# Patient Record
Sex: FEMALE | Race: OTHER | Employment: FULL TIME | URBAN - METROPOLITAN AREA
[De-identification: names, ages, dates, MRNs, and addresses within clinical notes are randomized per-mention and may not be internally consistent; named-entity substitution may affect disease eponyms.]

---

## 2021-09-09 ENCOUNTER — ANESTHESIA (OUTPATIENT)
Dept: SURGERY | Facility: HOSPITAL | Age: 21
DRG: 339 | End: 2021-09-09
Payer: COMMERCIAL

## 2021-09-09 ENCOUNTER — HOSPITAL ENCOUNTER (INPATIENT)
Facility: HOSPITAL | Age: 21
LOS: 5 days | Discharge: HOME | DRG: 339 | End: 2021-09-15
Attending: EMERGENCY MEDICINE | Admitting: SURGERY
Payer: COMMERCIAL

## 2021-09-09 ENCOUNTER — APPOINTMENT (OUTPATIENT)
Dept: CT IMAGING | Facility: HOSPITAL | Age: 21
DRG: 339 | End: 2021-09-09
Attending: EMERGENCY MEDICINE
Payer: COMMERCIAL

## 2021-09-09 ENCOUNTER — ANESTHESIA EVENT (OUTPATIENT)
Dept: SURGERY | Facility: HOSPITAL | Age: 21
DRG: 339 | End: 2021-09-09
Payer: COMMERCIAL

## 2021-09-09 DIAGNOSIS — K35.32 ACUTE APPENDICITIS WITH PERFORATION AND LOCALIZED PERITONITIS, UNSPECIFIED WHETHER ABSCESS PRESENT, UNSPECIFIED WHETHER GANGRENE PRESENT: Primary | ICD-10-CM

## 2021-09-09 DIAGNOSIS — K37 APPENDICITIS: ICD-10-CM

## 2021-09-09 LAB
ALBUMIN SERPL-MCNC: 4.2 G/DL (ref 3.4–5)
ALBUMIN/GLOB SERPL: 1.1 {RATIO} (ref 1–2)
ALP LIVER SERPL-CCNC: 58 U/L
ALT SERPL-CCNC: 17 U/L
ANION GAP SERPL CALC-SCNC: 8 MMOL/L (ref 0–18)
AST SERPL-CCNC: 13 U/L (ref 15–37)
B-HCG UR QL: NEGATIVE
BASOPHILS # BLD AUTO: 0.03 X10(3) UL (ref 0–0.2)
BASOPHILS NFR BLD AUTO: 0.2 %
BILIRUB SERPL-MCNC: 0.5 MG/DL (ref 0.1–2)
BILIRUB UR QL STRIP.AUTO: NEGATIVE
BUN BLD-MCNC: 10 MG/DL (ref 7–18)
CALCIUM BLD-MCNC: 8.8 MG/DL (ref 8.5–10.1)
CHLORIDE SERPL-SCNC: 109 MMOL/L (ref 98–112)
CLARITY UR REFRACT.AUTO: CLEAR
CO2 SERPL-SCNC: 22 MMOL/L (ref 21–32)
COLOR UR AUTO: YELLOW
CREAT BLD-MCNC: 0.74 MG/DL
EOSINOPHIL # BLD AUTO: 0 X10(3) UL (ref 0–0.7)
EOSINOPHIL NFR BLD AUTO: 0 %
ERYTHROCYTE [DISTWIDTH] IN BLOOD BY AUTOMATED COUNT: 13.2 %
GLOBULIN PLAS-MCNC: 3.8 G/DL (ref 2.8–4.4)
GLUCOSE BLD-MCNC: 133 MG/DL (ref 70–99)
GLUCOSE UR STRIP.AUTO-MCNC: 50 MG/DL
HCG SERPL QL: NEGATIVE
HCT VFR BLD AUTO: 37.1 %
HGB BLD-MCNC: 12.5 G/DL
IMM GRANULOCYTES # BLD AUTO: 0.07 X10(3) UL (ref 0–1)
IMM GRANULOCYTES NFR BLD: 0.5 %
KETONES UR STRIP.AUTO-MCNC: 20 MG/DL
LEUKOCYTE ESTERASE UR QL STRIP.AUTO: NEGATIVE
LIPASE SERPL-CCNC: 164 U/L (ref 73–393)
LYMPHOCYTES # BLD AUTO: 0.81 X10(3) UL (ref 1–4)
LYMPHOCYTES NFR BLD AUTO: 6.3 %
M PROTEIN MFR SERPL ELPH: 8 G/DL (ref 6.4–8.2)
MCH RBC QN AUTO: 28.3 PG (ref 26–34)
MCHC RBC AUTO-ENTMCNC: 33.7 G/DL (ref 31–37)
MCV RBC AUTO: 84.1 FL
MONOCYTES # BLD AUTO: 0.33 X10(3) UL (ref 0.1–1)
MONOCYTES NFR BLD AUTO: 2.6 %
NEUTROPHILS # BLD AUTO: 11.57 X10 (3) UL (ref 1.5–7.7)
NEUTROPHILS # BLD AUTO: 11.57 X10(3) UL (ref 1.5–7.7)
NEUTROPHILS NFR BLD AUTO: 90.4 %
NITRITE UR QL STRIP.AUTO: NEGATIVE
OSMOLALITY SERPL CALC.SUM OF ELEC: 289 MOSM/KG (ref 275–295)
PH UR STRIP.AUTO: 6 [PH] (ref 5–8)
PLATELET # BLD AUTO: 315 10(3)UL (ref 150–450)
POTASSIUM SERPL-SCNC: 3.3 MMOL/L (ref 3.5–5.1)
PROT UR STRIP.AUTO-MCNC: NEGATIVE MG/DL
RBC # BLD AUTO: 4.41 X10(6)UL
RBC #/AREA URNS AUTO: >10 /HPF
SARS-COV-2 RNA RESP QL NAA+PROBE: NOT DETECTED
SODIUM SERPL-SCNC: 139 MMOL/L (ref 136–145)
SP GR UR STRIP.AUTO: >1.03 (ref 1–1.03)
UROBILINOGEN UR STRIP.AUTO-MCNC: <2 MG/DL
WBC # BLD AUTO: 12.8 X10(3) UL (ref 4–11)

## 2021-09-09 PROCEDURE — 0DTJ4ZZ RESECTION OF APPENDIX, PERCUTANEOUS ENDOSCOPIC APPROACH: ICD-10-PCS | Performed by: SURGERY

## 2021-09-09 PROCEDURE — 74177 CT ABD & PELVIS W/CONTRAST: CPT | Performed by: EMERGENCY MEDICINE

## 2021-09-09 PROCEDURE — 44970 LAPAROSCOPY APPENDECTOMY: CPT | Performed by: SURGERY

## 2021-09-09 PROCEDURE — 99222 1ST HOSP IP/OBS MODERATE 55: CPT | Performed by: SURGERY

## 2021-09-09 RX ORDER — POTASSIUM CHLORIDE 14.9 MG/ML
20 INJECTION INTRAVENOUS ONCE
Status: COMPLETED | OUTPATIENT
Start: 2021-09-09 | End: 2021-09-09

## 2021-09-09 RX ORDER — POTASSIUM CHLORIDE 20 MEQ/1
20 TABLET, EXTENDED RELEASE ORAL ONCE
Status: DISCONTINUED | OUTPATIENT
Start: 2021-09-09 | End: 2021-09-09

## 2021-09-09 RX ORDER — ACETAMINOPHEN 500 MG
1000 TABLET ORAL ONCE AS NEEDED
Status: ACTIVE | OUTPATIENT
Start: 2021-09-09 | End: 2021-09-09

## 2021-09-09 RX ORDER — LABETALOL HYDROCHLORIDE 5 MG/ML
5 INJECTION, SOLUTION INTRAVENOUS EVERY 5 MIN PRN
Status: DISCONTINUED | OUTPATIENT
Start: 2021-09-09 | End: 2021-09-10 | Stop reason: HOSPADM

## 2021-09-09 RX ORDER — DEXAMETHASONE SODIUM PHOSPHATE 4 MG/ML
VIAL (ML) INJECTION AS NEEDED
Status: DISCONTINUED | OUTPATIENT
Start: 2021-09-09 | End: 2021-09-10 | Stop reason: SURG

## 2021-09-09 RX ORDER — MEPERIDINE HYDROCHLORIDE 25 MG/ML
12.5 INJECTION INTRAMUSCULAR; INTRAVENOUS; SUBCUTANEOUS AS NEEDED
Status: DISCONTINUED | OUTPATIENT
Start: 2021-09-09 | End: 2021-09-10 | Stop reason: HOSPADM

## 2021-09-09 RX ORDER — SODIUM CHLORIDE, SODIUM LACTATE, POTASSIUM CHLORIDE, CALCIUM CHLORIDE 600; 310; 30; 20 MG/100ML; MG/100ML; MG/100ML; MG/100ML
INJECTION, SOLUTION INTRAVENOUS CONTINUOUS
Status: DISCONTINUED | OUTPATIENT
Start: 2021-09-09 | End: 2021-09-10 | Stop reason: HOSPADM

## 2021-09-09 RX ORDER — SODIUM CHLORIDE 9 MG/ML
INJECTION, SOLUTION INTRAVENOUS CONTINUOUS
Status: DISCONTINUED | OUTPATIENT
Start: 2021-09-09 | End: 2021-09-10

## 2021-09-09 RX ORDER — ONDANSETRON 2 MG/ML
4 INJECTION INTRAMUSCULAR; INTRAVENOUS AS NEEDED
Status: DISCONTINUED | OUTPATIENT
Start: 2021-09-09 | End: 2021-09-10 | Stop reason: HOSPADM

## 2021-09-09 RX ORDER — NALOXONE HYDROCHLORIDE 0.4 MG/ML
80 INJECTION, SOLUTION INTRAMUSCULAR; INTRAVENOUS; SUBCUTANEOUS AS NEEDED
Status: DISCONTINUED | OUTPATIENT
Start: 2021-09-09 | End: 2021-09-10 | Stop reason: HOSPADM

## 2021-09-09 RX ORDER — HYDROCODONE BITARTRATE AND ACETAMINOPHEN 5; 325 MG/1; MG/1
1 TABLET ORAL AS NEEDED
Status: DISCONTINUED | OUTPATIENT
Start: 2021-09-09 | End: 2021-09-10 | Stop reason: HOSPADM

## 2021-09-09 RX ORDER — HYDROMORPHONE HYDROCHLORIDE 1 MG/ML
0.4 INJECTION, SOLUTION INTRAMUSCULAR; INTRAVENOUS; SUBCUTANEOUS EVERY 5 MIN PRN
Status: DISCONTINUED | OUTPATIENT
Start: 2021-09-09 | End: 2021-09-10 | Stop reason: HOSPADM

## 2021-09-09 RX ORDER — MIDAZOLAM HYDROCHLORIDE 1 MG/ML
1 INJECTION INTRAMUSCULAR; INTRAVENOUS EVERY 5 MIN PRN
Status: DISCONTINUED | OUTPATIENT
Start: 2021-09-09 | End: 2021-09-10 | Stop reason: HOSPADM

## 2021-09-09 RX ORDER — HYDROCODONE BITARTRATE AND ACETAMINOPHEN 5; 325 MG/1; MG/1
2 TABLET ORAL AS NEEDED
Status: DISCONTINUED | OUTPATIENT
Start: 2021-09-09 | End: 2021-09-10 | Stop reason: HOSPADM

## 2021-09-09 RX ORDER — METOCLOPRAMIDE HYDROCHLORIDE 5 MG/ML
10 INJECTION INTRAMUSCULAR; INTRAVENOUS AS NEEDED
Status: DISCONTINUED | OUTPATIENT
Start: 2021-09-09 | End: 2021-09-10 | Stop reason: HOSPADM

## 2021-09-09 RX ORDER — ONDANSETRON 2 MG/ML
4 INJECTION INTRAMUSCULAR; INTRAVENOUS
Status: DISCONTINUED | OUTPATIENT
Start: 2021-09-09 | End: 2021-09-10

## 2021-09-09 RX ORDER — METOCLOPRAMIDE HYDROCHLORIDE 5 MG/ML
5 INJECTION INTRAMUSCULAR; INTRAVENOUS ONCE
Status: COMPLETED | OUTPATIENT
Start: 2021-09-09 | End: 2021-09-09

## 2021-09-09 RX ORDER — ROCURONIUM BROMIDE 10 MG/ML
INJECTION, SOLUTION INTRAVENOUS AS NEEDED
Status: DISCONTINUED | OUTPATIENT
Start: 2021-09-09 | End: 2021-09-10 | Stop reason: SURG

## 2021-09-09 RX ORDER — DIPHENHYDRAMINE HYDROCHLORIDE 50 MG/ML
25 INJECTION INTRAMUSCULAR; INTRAVENOUS ONCE
Status: COMPLETED | OUTPATIENT
Start: 2021-09-09 | End: 2021-09-09

## 2021-09-09 RX ORDER — HYDROMORPHONE HYDROCHLORIDE 1 MG/ML
0.5 INJECTION, SOLUTION INTRAMUSCULAR; INTRAVENOUS; SUBCUTANEOUS EVERY 30 MIN PRN
Status: DISCONTINUED | OUTPATIENT
Start: 2021-09-09 | End: 2021-09-10

## 2021-09-09 RX ORDER — LIDOCAINE HYDROCHLORIDE 10 MG/ML
INJECTION, SOLUTION EPIDURAL; INFILTRATION; INTRACAUDAL; PERINEURAL AS NEEDED
Status: DISCONTINUED | OUTPATIENT
Start: 2021-09-09 | End: 2021-09-10 | Stop reason: SURG

## 2021-09-09 RX ADMIN — LIDOCAINE HYDROCHLORIDE 50 MG: 10 INJECTION, SOLUTION EPIDURAL; INFILTRATION; INTRACAUDAL; PERINEURAL at 23:03:00

## 2021-09-09 RX ADMIN — ROCURONIUM BROMIDE 30 MG: 10 INJECTION, SOLUTION INTRAVENOUS at 23:03:00

## 2021-09-09 RX ADMIN — DEXAMETHASONE SODIUM PHOSPHATE 8 MG: 4 MG/ML VIAL (ML) INJECTION at 23:03:00

## 2021-09-10 LAB
ANION GAP SERPL CALC-SCNC: 9 MMOL/L (ref 0–18)
BUN BLD-MCNC: 6 MG/DL (ref 7–18)
CALCIUM BLD-MCNC: 8.1 MG/DL (ref 8.5–10.1)
CHLORIDE SERPL-SCNC: 111 MMOL/L (ref 98–112)
CO2 SERPL-SCNC: 20 MMOL/L (ref 21–32)
CREAT BLD-MCNC: 0.62 MG/DL
ERYTHROCYTE [DISTWIDTH] IN BLOOD BY AUTOMATED COUNT: 13.4 %
GLUCOSE BLD-MCNC: 133 MG/DL (ref 70–99)
HCT VFR BLD AUTO: 35.4 %
HGB BLD-MCNC: 11.2 G/DL
MCH RBC QN AUTO: 26.9 PG (ref 26–34)
MCHC RBC AUTO-ENTMCNC: 31.6 G/DL (ref 31–37)
MCV RBC AUTO: 85.1 FL
OSMOLALITY SERPL CALC.SUM OF ELEC: 290 MOSM/KG (ref 275–295)
PLATELET # BLD AUTO: 250 10(3)UL (ref 150–450)
POTASSIUM SERPL-SCNC: 3.7 MMOL/L (ref 3.5–5.1)
RBC # BLD AUTO: 4.16 X10(6)UL
SODIUM SERPL-SCNC: 140 MMOL/L (ref 136–145)
WBC # BLD AUTO: 8.9 X10(3) UL (ref 4–11)

## 2021-09-10 RX ORDER — KETOROLAC TROMETHAMINE 30 MG/ML
30 INJECTION, SOLUTION INTRAMUSCULAR; INTRAVENOUS EVERY 6 HOURS
Status: DISPENSED | OUTPATIENT
Start: 2021-09-10 | End: 2021-09-11

## 2021-09-10 RX ORDER — GLYCOPYRROLATE 0.2 MG/ML
INJECTION, SOLUTION INTRAMUSCULAR; INTRAVENOUS AS NEEDED
Status: DISCONTINUED | OUTPATIENT
Start: 2021-09-10 | End: 2021-09-10 | Stop reason: SURG

## 2021-09-10 RX ORDER — NEOSTIGMINE METHYLSULFATE 1 MG/ML
INJECTION INTRAVENOUS AS NEEDED
Status: DISCONTINUED | OUTPATIENT
Start: 2021-09-10 | End: 2021-09-10 | Stop reason: SURG

## 2021-09-10 RX ORDER — BISACODYL 10 MG
10 SUPPOSITORY, RECTAL RECTAL
Status: DISCONTINUED | OUTPATIENT
Start: 2021-09-10 | End: 2021-09-15

## 2021-09-10 RX ORDER — ONDANSETRON 2 MG/ML
4 INJECTION INTRAMUSCULAR; INTRAVENOUS EVERY 6 HOURS PRN
Status: DISCONTINUED | OUTPATIENT
Start: 2021-09-10 | End: 2021-09-15

## 2021-09-10 RX ORDER — HYDROMORPHONE HYDROCHLORIDE 1 MG/ML
0.5 INJECTION, SOLUTION INTRAMUSCULAR; INTRAVENOUS; SUBCUTANEOUS EVERY 30 MIN PRN
Status: ACTIVE | OUTPATIENT
Start: 2021-09-10 | End: 2021-09-10

## 2021-09-10 RX ORDER — OXYCODONE HYDROCHLORIDE 5 MG/1
5 TABLET ORAL EVERY 4 HOURS PRN
Status: DISCONTINUED | OUTPATIENT
Start: 2021-09-10 | End: 2021-09-15

## 2021-09-10 RX ORDER — OXYCODONE HYDROCHLORIDE 10 MG/1
10 TABLET ORAL EVERY 4 HOURS PRN
Status: DISCONTINUED | OUTPATIENT
Start: 2021-09-10 | End: 2021-09-15

## 2021-09-10 RX ORDER — BUPIVACAINE HYDROCHLORIDE AND EPINEPHRINE 5; 5 MG/ML; UG/ML
INJECTION, SOLUTION EPIDURAL; INTRACAUDAL; PERINEURAL AS NEEDED
Status: DISCONTINUED | OUTPATIENT
Start: 2021-09-10 | End: 2021-09-10 | Stop reason: HOSPADM

## 2021-09-10 RX ORDER — ONDANSETRON 2 MG/ML
4 INJECTION INTRAMUSCULAR; INTRAVENOUS EVERY 4 HOURS PRN
Status: ACTIVE | OUTPATIENT
Start: 2021-09-10 | End: 2021-09-10

## 2021-09-10 RX ORDER — ENOXAPARIN SODIUM 100 MG/ML
40 INJECTION SUBCUTANEOUS DAILY
Status: DISCONTINUED | OUTPATIENT
Start: 2021-09-10 | End: 2021-09-15

## 2021-09-10 RX ORDER — SODIUM PHOSPHATE, DIBASIC AND SODIUM PHOSPHATE, MONOBASIC 7; 19 G/133ML; G/133ML
1 ENEMA RECTAL ONCE AS NEEDED
Status: DISCONTINUED | OUTPATIENT
Start: 2021-09-10 | End: 2021-09-15

## 2021-09-10 RX ORDER — ONDANSETRON 2 MG/ML
INJECTION INTRAMUSCULAR; INTRAVENOUS
Status: COMPLETED
Start: 2021-09-10 | End: 2021-09-10

## 2021-09-10 RX ORDER — KETOROLAC TROMETHAMINE 30 MG/ML
INJECTION, SOLUTION INTRAMUSCULAR; INTRAVENOUS AS NEEDED
Status: DISCONTINUED | OUTPATIENT
Start: 2021-09-10 | End: 2021-09-10 | Stop reason: SURG

## 2021-09-10 RX ORDER — SODIUM CHLORIDE 9 MG/ML
INJECTION, SOLUTION INTRAVENOUS CONTINUOUS
Status: ACTIVE | OUTPATIENT
Start: 2021-09-10 | End: 2021-09-10

## 2021-09-10 RX ORDER — LEVOFLOXACIN 5 MG/ML
750 INJECTION, SOLUTION INTRAVENOUS EVERY 24 HOURS
Status: DISCONTINUED | OUTPATIENT
Start: 2021-09-10 | End: 2021-09-13

## 2021-09-10 RX ORDER — HYDROMORPHONE HYDROCHLORIDE 1 MG/ML
0.8 INJECTION, SOLUTION INTRAMUSCULAR; INTRAVENOUS; SUBCUTANEOUS EVERY 2 HOUR PRN
Status: DISCONTINUED | OUTPATIENT
Start: 2021-09-10 | End: 2021-09-15

## 2021-09-10 RX ORDER — HYDROMORPHONE HYDROCHLORIDE 1 MG/ML
0.4 INJECTION, SOLUTION INTRAMUSCULAR; INTRAVENOUS; SUBCUTANEOUS EVERY 2 HOUR PRN
Status: DISCONTINUED | OUTPATIENT
Start: 2021-09-10 | End: 2021-09-15

## 2021-09-10 RX ORDER — DOCUSATE SODIUM 100 MG/1
100 CAPSULE, LIQUID FILLED ORAL 2 TIMES DAILY
Status: DISCONTINUED | OUTPATIENT
Start: 2021-09-10 | End: 2021-09-15

## 2021-09-10 RX ORDER — ONDANSETRON 2 MG/ML
INJECTION INTRAMUSCULAR; INTRAVENOUS AS NEEDED
Status: DISCONTINUED | OUTPATIENT
Start: 2021-09-10 | End: 2021-09-10 | Stop reason: SURG

## 2021-09-10 RX ORDER — METRONIDAZOLE 500 MG/100ML
500 INJECTION, SOLUTION INTRAVENOUS EVERY 8 HOURS
Status: DISCONTINUED | OUTPATIENT
Start: 2021-09-10 | End: 2021-09-13

## 2021-09-10 RX ORDER — IBUPROFEN 600 MG/1
600 TABLET ORAL EVERY 6 HOURS SCHEDULED
Status: DISCONTINUED | OUTPATIENT
Start: 2021-09-11 | End: 2021-09-15

## 2021-09-10 RX ORDER — POLYETHYLENE GLYCOL 3350 17 G/17G
17 POWDER, FOR SOLUTION ORAL DAILY PRN
Status: DISCONTINUED | OUTPATIENT
Start: 2021-09-10 | End: 2021-09-15

## 2021-09-10 RX ADMIN — ROCURONIUM BROMIDE 10 MG: 10 INJECTION, SOLUTION INTRAVENOUS at 00:01:00

## 2021-09-10 RX ADMIN — GLYCOPYRROLATE 0.4 MG: 0.2 INJECTION, SOLUTION INTRAMUSCULAR; INTRAVENOUS at 00:31:00

## 2021-09-10 RX ADMIN — NEOSTIGMINE METHYLSULFATE 4 MG: 1 INJECTION INTRAVENOUS at 00:31:00

## 2021-09-10 RX ADMIN — SODIUM CHLORIDE: 9 INJECTION, SOLUTION INTRAVENOUS at 00:38:00

## 2021-09-10 RX ADMIN — ONDANSETRON 4 MG: 2 INJECTION INTRAMUSCULAR; INTRAVENOUS at 00:20:00

## 2021-09-10 RX ADMIN — KETOROLAC TROMETHAMINE 30 MG: 30 INJECTION, SOLUTION INTRAMUSCULAR; INTRAVENOUS at 00:20:00

## 2021-09-10 NOTE — ANESTHESIA POSTPROCEDURE EVALUATION
[facility] Patient Status:  Emergency   Age/Gender 23year old female MRN [id number]   [facility] SURGERY Attending Kacie Aguiar MD   Hosp Day # 0 PCP None Pcp       Anesthesia Post-op Note    LAPAROSCOPIC APPENDECTO

## 2021-09-10 NOTE — PROGRESS NOTES
[facility]  Progress Note    Татьяна Leahy Patient Status:  Inpatient    2000 MRN [id number]   [facility] 0SW-A Attending Mel Vines MD   Hosp Day # 0 PCP None Pcp     Subjective: The patient is resting comfortably in bed. LEUUR Negative 09/09/2021       Assessment:  POD #1 Laparoscopic Appendectomy, partial cecectomy, and drainage of abdominal abscess     Plan:  1. Advance to General Diet  2. Antibiotics per ID service  3. Encouraged ambulation and up to chair  4.  DVT pr

## 2021-09-10 NOTE — H&P
Davide Patient Status:  Emergency    2000 MRN [id number]   [facility] SURGERY Attending Analilia Granados MD   Hosp Day # 0 PCP None Pcp     History of Present Illness:  Philomena Call is a(n General: Alert, orientated x3. Cooperative. No apparent distress. Vital Signs:  Blood pressure 128/72, pulse 78, temperature 98.9 °F (37.2 °C), temperature source Oral, resp.  rate 19, height 63\", weight 112 lb 7 oz (51 kg), last menstrual period 03/2 tonight. ·   · The chasidy-operative care plan was discussed with the patient, who voices understanding. Activity and lifting recommendations were discussed in length.    ·   · The risks, benefits, and alternatives to the procedure were explained to the rakan

## 2021-09-10 NOTE — CONSULTS
INFECTIOUS DISEASE CONSULT NOTE    Dollene Box Patient Status:  Inpatient    2000 MRN [id number]   [facility] 0SW-A Attending Grady Wade MD   Hosp Day # 0 PCP None Pcp (Roxicodone) tab 10 mg, 10 mg, Oral, Q4H PRN  •  HYDROmorphone HCl (DILAUDID) 1 MG/ML injection 0.4 mg, 0.4 mg, Intravenous, Q2H PRN **OR** HYDROmorphone HCl (DILAUDID) 1 MG/ML injection 0.8 mg, 0.8 mg, Intravenous, Q2H PRN  •  ondansetron (ZOFRAN) injecti rash. No open wounds. Laboratory Data:    Recent Labs   Lab 09/09/21  1925 09/10/21  0622   RBC 4.41 4.16   HGB 12.5 11.2*   HCT 37.1 35.4   MCV 84.1 85.1   MCH 28.3 26.9   MCHC 33.7 31.6   RDW 13.2 13.4   NEPRELIM 11.57*  --    WBC 12.8* 8.9   . BILIARY:  Gallbladder is unremarkable in appearance. . SPLEEN:  Normal.  No enlargement or focal lesion. PANCREAS:  No chasidy-pancreatic inflammatory stranding ADRENALS:  Normal.  No mass or enlargement.   KIDNEYS:  Kidneys are symmetrical in size without evid questions. I will continue to follow with you and will make further recommendations based on her progress.     Yael Lucia MD  9/10/2021  9:04 AM

## 2021-09-10 NOTE — PROGRESS NOTES
[facility] Pharmacy Note:  Dose Adjustment for levofloxacin (Will Presume)    Tanya Lee is a 23year old patient who has been prescribed levofloxacin (LEVAQUIN) 500 mg every 24 hrs. The estimated creatinine clearance is 96.8 mL/min (based on SCr of 0.74 mg/dL).  Stefany Pablo

## 2021-09-10 NOTE — ED INITIAL ASSESSMENT (HPI)
23year old female c/o nausea, vomiting, and diarrhea x6 since 0300 this morning with 10/10 pain in the mid suprapubic abdomen. A&Ox4.

## 2021-09-10 NOTE — PLAN OF CARE
NURSING ADMISSION NOTE      Patient admitted via Cart  Oriented to room. Safety precautions initiated. Bed in low position. Call light in reach. Lap sites x3 with derma bond well approximated no drainage noted.  xochitl to right abdomen with cloudy serosa

## 2021-09-10 NOTE — ANESTHESIA PROCEDURE NOTES
Airway  Date/Time: 9/9/2021 11:06 PM  Urgency: elective      General Information and Staff    Patient location during procedure: OR  Anesthesiologist: Eboni Polanco MD  Performed: anesthesiologist     Indications and Patient Condition  Indications for

## 2021-09-10 NOTE — OPERATIVE REPORT
[facility]    PATIENT'S NAME: Julieth Keene   ATTENDING PHYSICIAN: Tianna Krueger M.D. OPERATING PHYSICIAN: Tianna Krueger M.D.    PATIENT ACCOUNT#:   [de-identified]    LOCATION:  [address] [place] [address]  MEDICAL RECORD #:   [id number]       DATE OF BIRTH:  10 surgery. FINDINGS:  There was a perforated appendicitis with perforation at the base of the appendix and an ulcerating fecalith identified in this location.   There was associated feculent peritonitis with free flowing feces from the appendiceal stump ba The appendix was then placed into a retrieval bag and removed and submitted to Pathology. At this point, there was an open defect at the level of the cecum at the level of the appendiceal orifice.   The terminal ileum and right colon were then mobilized to quadrant was reapproximated using 0 Maxon suture. All skin incisions were cleansed irrigated and injected with local anesthetic after which they were reapproximated using subcuticular 4-0 Monocryl. The incisions were then sealed using Dermabond.   The kianna

## 2021-09-10 NOTE — BRIEF OP NOTE
Pre-Operative Diagnosis: Appendicitis [K37]     Post-Operative Diagnosis: APPENDICITIS WITH PERFORATION AND FECULENT PERITONITIS      Procedure Performed:   LAPAROSCOPIC APPENDECTOMY, PARTIAL CECECTOMY DRAINAGE OF ABDOMINAL ABSCESS    Surgeon(s) and Role:

## 2021-09-10 NOTE — ANESTHESIA PREPROCEDURE EVALUATION
PRE-OP EVALUATION    Patient Name: Jo Valenzuela    Admit Diagnosis: No admission diagnoses are documented for this encounter.     Pre-op Diagnosis: Appendicitis [K37]    LAPAROSCOPIC APPENDECTOMY, POSSIBLE OPEN    Anesthesia Procedure: Kraig Hogan Smoker      Smokeless tobacco: Never Used    Alcohol use: Yes      Comment: soc      Drug use:      Available pre-op labs reviewed.   Lab Results   Component Value Date    WBC 12.8 (H) 09/09/2021    RBC 4.41 09/09/2021    HGB 12.5 09/09/2021    HCT 37.1 09/

## 2021-09-10 NOTE — ED PROVIDER NOTES
Patient Seen in: [facility] Emergency Department      History   Patient presents with:  Abdomen/Flank Pain    Stated Complaint:     HPI/Subjective:   HPI    Patient complains of nausea, vomiting, and diarrhea.   Symptoms started at about 3 AM.  She h Lids and lashes are normal.  Neck: Supple  Lungs: Clear to auscultation bilaterally. No rhonchi or rales. Heart: Normal S1 and S2, without murmur or rub. Abdomen: Soft, nondistended. Tender diffusely across lower quadrants right greater than left.   Abigail Cage excluded.   Patient treated with IV fluid and nausea medicine      CBC shows elevated white count with a predominance of neutrophils on differential.  Hemoglobin and platelets are normal  Metabolic panel shows mild hypokalemia treated with IV potassium supp

## 2021-09-10 NOTE — PROGRESS NOTES
Assumed care of pt at 0700. Pain improved today-IV pain meds given x2. Pain worst to umbilical area. Late to order food but tolerating regular diet. Will work on transitioning to PO pain meds. Low grade temp 99.3 at 1700- will monitor.   MEDINA to DARRIN- lebron

## 2021-09-11 NOTE — PLAN OF CARE
Pt arrived to [facility] @ 0400. A&ox4, VSS, afebrile. Reports moderate tenderness to abdomen. Heat packs applied per pt request. Pt agreeable to try PO pain meds this morning. Denies nausea but reports low appetite. Denies passing flatus.  Lap sites x3 with derma opioid side effects  - Notify MD/LIP if interventions unsuccessful or patient reports new pain  - Anticipate increased pain with activity and pre-medicate as appropriate  Outcome: Progressing

## 2021-09-11 NOTE — PLAN OF CARE
Problem: Patient/Family Goals  Goal: Patient/Family Long Term Goal  Description: Patient's Long Term Goal: go home    Interventions:  - follow discharge instructions that will be given   -ambulate  --pain management  - See additional Care Plan goals for encouraged. Use of I.S and ambulation in hallways encouraged. Plan of care discussed with patient. Will monitor.

## 2021-09-11 NOTE — PROGRESS NOTES
[facility]  Progress Note    Petrona Vu Patient Status:  Inpatient    2000 MRN [id number]   [facility] 3NW-A Attending Jason Delgado MD   Hosp Day # 1 PCP None Pcp     Subjective:  No new complaints. She denies pain.  She re the above listed diagnoses and treatment options. Dann Moreno PA-C  9/11/2021  2:08 PM  Patient seen and examined. She is status post cecectomy for perforated appendicitis. No flatus no BM yet.   Her abdomen has normal postoperative tenderness  Drain

## 2021-09-11 NOTE — PLAN OF CARE
Received patient awake and oriented with significant other at bedside. On room air, breath sounds clear. 3 lap sites open to air, no drainage, MEDINA drain with milky pink drainage. Stated pain level 5/10 and medicated with Ibuprofen.  Sister at bedside overnig

## 2021-09-12 RX ORDER — SODIUM CHLORIDE 9 MG/ML
INJECTION, SOLUTION INTRAVENOUS CONTINUOUS
Status: DISCONTINUED | OUTPATIENT
Start: 2021-09-12 | End: 2021-09-15

## 2021-09-12 RX ORDER — METOCLOPRAMIDE HYDROCHLORIDE 5 MG/ML
10 INJECTION INTRAMUSCULAR; INTRAVENOUS EVERY 6 HOURS PRN
Status: DISCONTINUED | OUTPATIENT
Start: 2021-09-12 | End: 2021-09-15

## 2021-09-12 NOTE — PROGRESS NOTES
[facility]  Progress Note    Sky Mehta Patient Status:  Inpatient    2000 MRN [id number]   [facility] 3NW-A Attending Carlos Milligan MD   Hosp Day # 2 PCP None Pcp     Subjective:   The patient states that she is not doing w counseling the patient on the above listed diagnoses and treatment options. Sirni Roach PA-C  9/12/2021  2:39 PM  Patient with 3 episodes of vomiting today. She had been on a soft diet yesterday. Positive flatus no BM. Abdomen is flat soft.   Rosey Morley

## 2021-09-12 NOTE — PROGRESS NOTES
INFECTIOUS DISEASE PROGRESS NOTE    Raúl Eaton Patient Status:  Inpatient    2000 MRN [id number]   [facility] 0SW-A Attending Ashkan Subramanian MD   Hosp Day # 2 PCP None Pcp resp. rate 18, height 5' 3\" (1.6 m), weight 112 lb 7 oz (51 kg), last menstrual period 09/08/2021, SpO2 100 %, not currently breastfeeding. HEENT: no scleral icterus or conjunctival injection. Moist mucous membranes. No thrush  Neck: No lymphadenopathy. growth Escherichia coli (A) N/A    Body Fluid Culture Result 1+ growth Pseudomonas aeruginosa (A) N/A    Body Fld Smear 4+ Neutrophils seen (A) N/A    Body Fld Smear 1+ Gram Negative Rods (A) N/A    Body Fld Smear This is a cytocentrifuged smear.  (A) N/A 2. 5 mm. Coronal MPR imaging was obtained. Dose reduction techniques were used. Dose information is transmitted to the [employer] [facility] [employer]) [facility] ([address]) which includes the Dose Index Registry.   PATIENT STATED HISTO abscess and feculent peritonitis  a. Sp lap appendectomy on 9/10, cx with e coli, kleb and PSAR- zee for PSAR pend  b. Seems better but may not be ready for dc, n/v this am  c. Cont levaquin and flagyl for now  d. Follow temps and wbc  e.  Follow abd exam

## 2021-09-12 NOTE — PLAN OF CARE
A&Ox4. VSS. RA. . GI: Abdomen soft, nondistended, tender. Denies passing gas. Denies nausea. : Voids. Pain controlled with PRN pain medications and heat packs. Up ad moses. Drains: MEDINA x1 putting out tan/serosanguinous drainage into bulb.   Incision Utilize distraction and/or relaxation techniques  - Monitor for opioid side effects  - Notify MD/LIP if interventions unsuccessful or patient reports new pain  - Anticipate increased pain with activity and pre-medicate as appropriate  Outcome: Progressing

## 2021-09-12 NOTE — PLAN OF CARE
Problem: Patient/Family Goals  Goal: Patient/Family Long Term Goal  Description: Patient's Long Term Goal: Discharge    Interventions:  -IV abx, pain control, tolerate diet  - See additional Care Plan goals for specific interventions  Outcome: Progressin well . Abdominal incision dry and intact . Dale x 1 draining serous drainage . Very weak , plan of care discussed , answered all questions . Verbalized understanding .  Will continue to monitor

## 2021-09-13 RX ORDER — LEVOFLOXACIN 750 MG/1
750 TABLET ORAL NIGHTLY
Status: DISCONTINUED | OUTPATIENT
Start: 2021-09-13 | End: 2021-09-14

## 2021-09-13 RX ORDER — METRONIDAZOLE 500 MG/1
500 TABLET ORAL EVERY 8 HOURS SCHEDULED
Qty: 30 TABLET | Refills: 0 | Status: SHIPPED | OUTPATIENT
Start: 2021-09-13 | End: 2021-09-15

## 2021-09-13 RX ORDER — LEVOFLOXACIN 750 MG/1
750 TABLET ORAL DAILY
Qty: 10 TABLET | Refills: 0 | Status: SHIPPED | OUTPATIENT
Start: 2021-09-13 | End: 2021-09-15

## 2021-09-13 RX ORDER — METRONIDAZOLE 500 MG/1
500 TABLET ORAL EVERY 8 HOURS SCHEDULED
Status: DISCONTINUED | OUTPATIENT
Start: 2021-09-13 | End: 2021-09-14

## 2021-09-13 NOTE — PLAN OF CARE
A&Ox4. VSS. RA. . GI: Abdomen soft, nondistended, tender. Bowel sounds hypoactive. Passing gas. Denies nausea at this time. Antiemetics given PRN. : Voids. Pain controlled with PRN pain medications and heat packs.  Declines Oxycodone d/t \"feeling evaluate response  - Implement non-pharmacological measures as appropriate and evaluate response  - Consider cultural and social influences on pain and pain management  - Manage/alleviate anxiety  - Utilize distraction and/or relaxation techniques  - Monit

## 2021-09-13 NOTE — PLAN OF CARE
1844[facility] Nausea and emesis x1. Zofran given. Patient is alert and oriented. On room air. Abdomen is soft/ non-distended. Patient is passing gas. Patient denies nausea. MEDINA drain x1 with serous output.  Patient showered this Manage/alleviate anxiety  - Utilize distraction and/or relaxation techniques  - Monitor for opioid side effects  - Notify MD/LIP if interventions unsuccessful or patient reports new pain  - Anticipate increased pain with activity and pre-medicate as approp

## 2021-09-13 NOTE — PROGRESS NOTES
INFECTIOUS DISEASE PROGRESS NOTE    Anna Johnson Patient Status:  Inpatient    2000 MRN [id number]   [facility] 0SW-A Attending Rosa Morataya MD   Hosp Day # 3 PCP None Pcp distress. Alert and oriented x 3. Vital signs: Blood pressure 111/72, pulse 83, temperature 99.1 °F (37.3 °C), temperature source Oral, resp.  rate 18, height 5' 3\" (1.6 m), weight 112 lb 7 oz (51 kg), last menstrual period 09/08/2021, SpO2 100 %, not cur Body Fluid Culture Result 1+ growth Klebsiella oxytoca (A) N/A    Body Fluid Culture Result 1+ growth Escherichia coli (A) N/A    Body Fluid Culture Result 1+ growth Pseudomonas aeruginosa (A) N/A    Body Fld Smear 4+ Neutrophils seen (A) N/A    Body Fld S Reviewed. ASSESSMENT:  1. Perforated appendicitis with abscess and feculent peritonitis  a. S/p lap appendectomy on 9/10, cx with e coli, kleb and PSAR  b. Cont levaquin and flagyl for now  c. Follow temps and wbc  d. Follow abd exam  e.  Follow OR cx (f

## 2021-09-13 NOTE — PROGRESS NOTES
[facility]  Progress Note    La Barragan Patient Status:  Inpatient    2000 MRN [id number]   [facility] 3NW-A Attending Leonard Dockery MD   Hosp Day # 3 PCP None Pcp     Subjective: The patient is resting in bed.   She states denies any further nausea or emesis. May advance to soft diet. 2. Cultures positive for Klebsiella, E. coli, and Pseudomonas. Infectious disease service on board. 3. Continue IV antibiotics per infectious disease. 4. Continue drain care.  Will likely D

## 2021-09-14 RX ORDER — CIPROFLOXACIN 2 MG/ML
400 INJECTION, SOLUTION INTRAVENOUS EVERY 12 HOURS
Status: DISCONTINUED | OUTPATIENT
Start: 2021-09-14 | End: 2021-09-15

## 2021-09-14 RX ORDER — LEVOFLOXACIN 750 MG/1
750 TABLET ORAL DAILY
Status: DISCONTINUED | OUTPATIENT
Start: 2021-09-14 | End: 2021-09-14

## 2021-09-14 RX ORDER — LEVOFLOXACIN 5 MG/ML
750 INJECTION, SOLUTION INTRAVENOUS EVERY 24 HOURS
Status: DISCONTINUED | OUTPATIENT
Start: 2021-09-14 | End: 2021-09-14

## 2021-09-14 NOTE — PROGRESS NOTES
[facility]  Progress Note    Deepika Quan Patient Status:  Inpatient    2000 MRN [id number]   [facility] 3NW-A Attending Valarie Sands MD   Hosp Day # 4 PCP None Pcp     Subjective: The patient is resting in bed.   She states Infectious disease service on board. 3. Continue IV antibiotics per infectious disease. 4. Continue to encourage ambulation out of bed and up to chair. 5. Encourage use of incentive spirometer. 6. DVT prophylaxis with Lovenox.     I spent 23 minutes fac

## 2021-09-14 NOTE — PLAN OF CARE
A&O x4. VSS. RA.   Telemetry: NSR  GI: Abdomen soft, non distended. Passing gas. BM today. Denies nausea currently. : Voiding  Pain: controlled with PRN meds.   Up with standby assist.  Drains: MEDINA drain lower right abdomen  Incisions:3 laparoscopic sit Manage/alleviate anxiety  - Utilize distraction and/or relaxation techniques  - Monitor for opioid side effects  - Notify MD/LIP if interventions unsuccessful or patient reports new pain  - Anticipate increased pain with activity and pre-medicate as approp

## 2021-09-14 NOTE — PLAN OF CARE
Pt feels nauseous gave zofran at 0425 no vomiting.      PT axox4, resting in bed, voiding, abdomen soft non-tender, three lap sites with skin glue DEANNE, pt vomited a couple hours after oral levaquin, [facility] order levaquin IV to resume, pharmacy will start Monitor for opioid side effects  - Notify MD/LIP if interventions unsuccessful or patient reports new pain  - Anticipate increased pain with activity and pre-medicate as appropriate  Outcome: Progressing

## 2021-09-14 NOTE — PROGRESS NOTES
INFECTIOUS DISEASE PROGRESS NOTE    Alanmimi Janine Patient Status:  Inpatient    2000 MRN [id number]   [facility] 0SW-A Attending Carlos Milligan MD   Hosp Day # 4 PCP None Pcp Exam:    General: No acute distress. Alert and oriented x 3. Vital signs: Blood pressure 111/83, pulse 86, temperature 98.3 °F (36.8 °C), temperature source Oral, resp.  rate 18, height 5' 3\" (1.6 m), weight 112 lb 7 oz (51 kg), last menstrual period 09/0 Value Ref Range    Body Fluid Culture Result 1+ growth Klebsiella oxytoca (A) N/A    Body Fluid Culture Result 1+ growth Escherichia coli (A) N/A    Body Fluid Culture Result 1+ growth Pseudomonas aeruginosa (A) N/A    Body Fluid Culture Result 2+ growth F Tazobactam <=4 Sensitive      Trimethoprim/Sulfa <=20 Sensitive        Radiology: Reviewed. ASSESSMENT:  1. Perforated appendicitis with abscess and feculent peritonitis  a.  S/p lap appendectomy on 9/10, cx with e coli, kleb, PSAR and fusobacterium spec

## 2021-09-14 NOTE — PROGRESS NOTES
Pt felt nauseous after oral dose of levaquin called ID and Dr. Lexa Early authorized Levaquin IV but not IV Flagyl to be reassessed in the morning.

## 2021-09-14 NOTE — PROGRESS NOTES
INFECTIOUS DISEASE PROGRESS NOTE    Petrona Vu Patient Status:  Inpatient    2000 MRN [id number]   [facility] 0SW-A Attending Jason Delgado MD   Hosp Day # 4 PCP None Pcp above    Physical Exam:    General: No acute distress. Alert and oriented x 3. Vital signs: Blood pressure 107/73, pulse 75, temperature 98.4 °F (36.9 °C), temperature source Oral, resp.  rate 16, height 5' 3\" (1.6 m), weight 112 lb 7 oz (51 kg), last men unspecified   Result Value Ref Range    Body Fluid Culture Result 1+ growth Klebsiella oxytoca (A) N/A    Body Fluid Culture Result 1+ growth Escherichia coli (A) N/A    Body Fluid Culture Result 1+ growth Pseudomonas aeruginosa (A) N/A    Body Fluid Cultu Piperacillin + Tazobactam <=4 Sensitive      Trimethoprim/Sulfa <=20 Sensitive        Radiology: Reviewed. ASSESSMENT:  1. Perforated appendicitis with abscess and feculent peritonitis  a.  S/p lap appendectomy on 9/10, cx with e coli, kleb, PSAR and fus

## 2021-09-15 VITALS
HEIGHT: 63 IN | TEMPERATURE: 99 F | SYSTOLIC BLOOD PRESSURE: 108 MMHG | RESPIRATION RATE: 18 BRPM | OXYGEN SATURATION: 95 % | WEIGHT: 112.44 LBS | DIASTOLIC BLOOD PRESSURE: 66 MMHG | BODY MASS INDEX: 19.92 KG/M2 | HEART RATE: 81 BPM

## 2021-09-15 RX ORDER — OXYCODONE HYDROCHLORIDE 5 MG/1
5 TABLET ORAL EVERY 6 HOURS PRN
Qty: 10 TABLET | Refills: 0 | Status: ON HOLD | OUTPATIENT
Start: 2021-09-15 | End: 2021-09-20

## 2021-09-15 RX ORDER — AMOXICILLIN AND CLAVULANATE POTASSIUM 875; 125 MG/1; MG/1
875 TABLET, FILM COATED ORAL EVERY 12 HOURS SCHEDULED
Status: DISCONTINUED | OUTPATIENT
Start: 2021-09-15 | End: 2021-09-15

## 2021-09-15 RX ORDER — AMOXICILLIN AND CLAVULANATE POTASSIUM 875; 125 MG/1; MG/1
875 TABLET, FILM COATED ORAL EVERY 12 HOURS SCHEDULED
Qty: 20 TABLET | Refills: 0 | Status: SHIPPED | OUTPATIENT
Start: 2021-09-15 | End: 2021-09-25

## 2021-09-15 RX ORDER — CIPROFLOXACIN 750 MG/1
750 TABLET, FILM COATED ORAL EVERY 12 HOURS SCHEDULED
Qty: 20 TABLET | Refills: 0 | Status: SHIPPED | OUTPATIENT
Start: 2021-09-15 | End: 2021-09-25

## 2021-09-15 NOTE — PLAN OF CARE
A & O x4, RA. Tolerating diet. Denies nausea. Denies pain at this time. Reports feeling better compared too yesterday. No reactions noted to ABT. RLQ MEDINA drain to bulb suctions; scant output. MEDINA drain removed. Dry drsg applied. Up to shower.      Reviewe pain and pain management  - Manage/alleviate anxiety  - Utilize distraction and/or relaxation techniques  - Monitor for opioid side effects  - Notify MD/LIP if interventions unsuccessful or patient reports new pain  - Anticipate increased pain with activit

## 2021-09-15 NOTE — PROGRESS NOTES
INFECTIOUS DISEASE PROGRESS NOTE    Adam Saldana Patient Status:  Inpatient    2000 MRN [id number]   [facility] 0SW-A Attending Kem Rocha MD   Hosp Day # 5 PCP None Pcp Exam:    General: No acute distress. Alert and oriented x 3. Vital signs: Blood pressure 116/80, pulse 74, temperature 99.4 °F (37.4 °C), temperature source Oral, resp.  rate 18, height 5' 3\" (1.6 m), weight 112 lb 7 oz (51 kg), last menstrual period 09/0 Body Fluid Culture Result 1+ growth Klebsiella oxytoca (A) N/A    Body Fluid Culture Result 1+ growth Escherichia coli (A) N/A    Body Fluid Culture Result 1+ growth Pseudomonas aeruginosa (A) N/A    Body Fluid Culture Result 2+ growth Fusobacterium spe Sensitive      Trimethoprim/Sulfa <=20 Sensitive        Radiology: Reviewed. ASSESSMENT:  1. Perforated appendicitis with abscess and feculent peritonitis  a. S/p lap appendectomy on 9/10, cx with e coli, kleb, PSAR and fusobacterium species  b.  Follow

## 2021-09-15 NOTE — PROGRESS NOTES
[facility]  Progress Note    Abi Tran Patient Status:  Inpatient    2000 MRN [id number]   [facility] 3NW-A Attending Vita Stone MD   Hosp Day # 5 PCP None Pcp     Subjective: The patient is resting in bed.   She states spirometer. 8. DVT prophylaxis with Lovenox. I spent 23 minutes face to face with the patient. More that half of that time was spent counseling the patient and/or on coordination of care.  The diagnosis, prognosis, and general treatment was explained to

## 2021-09-15 NOTE — PLAN OF CARE
Pt axox4, abdomen flat non-distended, no vomiting, still a little nausea gave zofran, pt voiding, passing gas and belching, had bowel movement, pt has Iv antibiotics infusing and NS at 100ml/hr, lungs clear, pt care plan updated with patient all questions Anticipate increased pain with activity and pre-medicate as appropriate  Outcome: Progressing

## 2021-09-17 ENCOUNTER — TELEPHONE (OUTPATIENT)
Dept: SURGERY | Facility: CLINIC | Age: 21
End: 2021-09-17

## 2021-09-17 NOTE — DISCHARGE SUMMARY
[facility]  Discharge Summary    Wilson Coffey Patient Status:  Inpatient    2000 MRN [id number]   [facility] 3NW-A Attending No att. providers found   Hosp Day # 5 PCP None Pcp     Date of Admission: 2021    Date of Discharge: infectious disease    Complications: none     Disposition: Home to self care   Discharge Condition: Good    Discharge Medications: Discharge Medication List as of 9/15/2021 11:39 AM    START taking these medications    ciprofloxacin 750 MG Oral Tab  Take 1

## 2021-09-17 NOTE — TELEPHONE ENCOUNTER
Pt asking for clarification on what antibiotic(s) she should be taking. Per ID pt to be on Cipro and Augmentin. Both scripts sent to pharmacy 9/15 with receipt confirmed.  Pt DEJON

## 2021-09-19 ENCOUNTER — HOSPITAL ENCOUNTER (OUTPATIENT)
Facility: HOSPITAL | Age: 21
Setting detail: OBSERVATION
Discharge: HOME | End: 2021-09-20
Attending: EMERGENCY MEDICINE | Admitting: INTERNAL MEDICINE
Payer: COMMERCIAL

## 2021-09-19 DIAGNOSIS — K56.609 SBO (SMALL BOWEL OBSTRUCTION) (HCC): Primary | ICD-10-CM

## 2021-09-19 LAB
ALBUMIN SERPL-MCNC: 3.7 G/DL (ref 3.4–5)
ALBUMIN/GLOB SERPL: 0.7 {RATIO} (ref 1–2)
ALP LIVER SERPL-CCNC: 53 U/L
ALT SERPL-CCNC: 28 U/L
ANION GAP SERPL CALC-SCNC: 5 MMOL/L (ref 0–18)
AST SERPL-CCNC: 15 U/L (ref 15–37)
BASOPHILS # BLD AUTO: 0.07 X10(3) UL (ref 0–0.2)
BASOPHILS NFR BLD AUTO: 0.5 %
BILIRUB SERPL-MCNC: 0.3 MG/DL (ref 0.1–2)
BUN BLD-MCNC: 12 MG/DL (ref 7–18)
CALCIUM BLD-MCNC: 9.3 MG/DL (ref 8.5–10.1)
CHLORIDE SERPL-SCNC: 105 MMOL/L (ref 98–112)
CO2 SERPL-SCNC: 28 MMOL/L (ref 21–32)
CREAT BLD-MCNC: 0.64 MG/DL
EOSINOPHIL # BLD AUTO: 0.21 X10(3) UL (ref 0–0.7)
EOSINOPHIL NFR BLD AUTO: 1.5 %
ERYTHROCYTE [DISTWIDTH] IN BLOOD BY AUTOMATED COUNT: 13.3 %
GLOBULIN PLAS-MCNC: 5 G/DL (ref 2.8–4.4)
GLUCOSE BLD-MCNC: 109 MG/DL (ref 70–99)
HCT VFR BLD AUTO: 41.7 %
HGB BLD-MCNC: 13.4 G/DL
IMM GRANULOCYTES # BLD AUTO: 0.09 X10(3) UL (ref 0–1)
IMM GRANULOCYTES NFR BLD: 0.6 %
LYMPHOCYTES # BLD AUTO: 1.25 X10(3) UL (ref 1–4)
LYMPHOCYTES NFR BLD AUTO: 8.9 %
MCH RBC QN AUTO: 26.9 PG (ref 26–34)
MCHC RBC AUTO-ENTMCNC: 32.1 G/DL (ref 31–37)
MCV RBC AUTO: 83.7 FL
MONOCYTES # BLD AUTO: 0.5 X10(3) UL (ref 0.1–1)
MONOCYTES NFR BLD AUTO: 3.6 %
NEUTROPHILS # BLD AUTO: 11.85 X10 (3) UL (ref 1.5–7.7)
NEUTROPHILS # BLD AUTO: 11.85 X10(3) UL (ref 1.5–7.7)
NEUTROPHILS NFR BLD AUTO: 84.9 %
OSMOLALITY SERPL CALC.SUM OF ELEC: 286 MOSM/KG (ref 275–295)
PLATELET # BLD AUTO: 488 10(3)UL (ref 150–450)
POTASSIUM SERPL-SCNC: 3.9 MMOL/L (ref 3.5–5.1)
PROT SERPL-MCNC: 8.7 G/DL (ref 6.4–8.2)
RBC # BLD AUTO: 4.98 X10(6)UL
SODIUM SERPL-SCNC: 138 MMOL/L (ref 136–145)
WBC # BLD AUTO: 14 X10(3) UL (ref 4–11)

## 2021-09-19 RX ORDER — ONDANSETRON 2 MG/ML
4 INJECTION INTRAMUSCULAR; INTRAVENOUS ONCE
Status: COMPLETED | OUTPATIENT
Start: 2021-09-19 | End: 2021-09-19

## 2021-09-20 ENCOUNTER — APPOINTMENT (OUTPATIENT)
Dept: CT IMAGING | Facility: HOSPITAL | Age: 21
End: 2021-09-20
Attending: EMERGENCY MEDICINE
Payer: COMMERCIAL

## 2021-09-20 VITALS
WEIGHT: 99 LBS | BODY MASS INDEX: 18.22 KG/M2 | HEIGHT: 62 IN | RESPIRATION RATE: 18 BRPM | DIASTOLIC BLOOD PRESSURE: 61 MMHG | SYSTOLIC BLOOD PRESSURE: 99 MMHG | TEMPERATURE: 99 F | HEART RATE: 71 BPM | OXYGEN SATURATION: 100 %

## 2021-09-20 PROBLEM — K56.609 SBO (SMALL BOWEL OBSTRUCTION) (HCC): Status: ACTIVE | Noted: 2021-09-20

## 2021-09-20 LAB
BILIRUB UR QL STRIP.AUTO: NEGATIVE
COLOR UR AUTO: YELLOW
ERYTHROCYTE [DISTWIDTH] IN BLOOD BY AUTOMATED COUNT: 13.3 %
GLUCOSE UR STRIP.AUTO-MCNC: NEGATIVE MG/DL
HCT VFR BLD AUTO: 34.5 %
HGB BLD-MCNC: 10.9 G/DL
LEUKOCYTE ESTERASE UR QL STRIP.AUTO: NEGATIVE
MCH RBC QN AUTO: 27 PG (ref 26–34)
MCHC RBC AUTO-ENTMCNC: 31.6 G/DL (ref 31–37)
MCV RBC AUTO: 85.6 FL
NITRITE UR QL STRIP.AUTO: NEGATIVE
PH UR STRIP.AUTO: 6 [PH] (ref 5–8)
PLATELET # BLD AUTO: 460 10(3)UL (ref 150–450)
PROT UR STRIP.AUTO-MCNC: NEGATIVE MG/DL
RBC # BLD AUTO: 4.03 X10(6)UL
SARS-COV-2 RNA RESP QL NAA+PROBE: NOT DETECTED
SP GR UR STRIP.AUTO: 1.02 (ref 1–1.03)
UROBILINOGEN UR STRIP.AUTO-MCNC: <2 MG/DL
WBC # BLD AUTO: 9.1 X10(3) UL (ref 4–11)

## 2021-09-20 PROCEDURE — 3074F SYST BP LT 130 MM HG: CPT | Performed by: INTERNAL MEDICINE

## 2021-09-20 PROCEDURE — 3008F BODY MASS INDEX DOCD: CPT | Performed by: INTERNAL MEDICINE

## 2021-09-20 PROCEDURE — 3078F DIAST BP <80 MM HG: CPT | Performed by: INTERNAL MEDICINE

## 2021-09-20 PROCEDURE — 74177 CT ABD & PELVIS W/CONTRAST: CPT | Performed by: EMERGENCY MEDICINE

## 2021-09-20 PROCEDURE — 99234 HOSP IP/OBS SM DT SF/LOW 45: CPT | Performed by: INTERNAL MEDICINE

## 2021-09-20 RX ORDER — ONDANSETRON 2 MG/ML
4 INJECTION INTRAMUSCULAR; INTRAVENOUS EVERY 6 HOURS PRN
Status: DISCONTINUED | OUTPATIENT
Start: 2021-09-20 | End: 2021-09-20

## 2021-09-20 RX ORDER — KETOROLAC TROMETHAMINE 15 MG/ML
15 INJECTION, SOLUTION INTRAMUSCULAR; INTRAVENOUS EVERY 6 HOURS PRN
Status: DISCONTINUED | OUTPATIENT
Start: 2021-09-20 | End: 2021-09-20

## 2021-09-20 RX ORDER — METRONIDAZOLE 500 MG/100ML
500 INJECTION, SOLUTION INTRAVENOUS EVERY 8 HOURS
Status: DISCONTINUED | OUTPATIENT
Start: 2021-09-20 | End: 2021-09-20

## 2021-09-20 RX ORDER — METOCLOPRAMIDE HYDROCHLORIDE 5 MG/ML
10 INJECTION INTRAMUSCULAR; INTRAVENOUS ONCE
Status: COMPLETED | OUTPATIENT
Start: 2021-09-20 | End: 2021-09-20

## 2021-09-20 RX ORDER — SODIUM PHOSPHATE, DIBASIC AND SODIUM PHOSPHATE, MONOBASIC 7; 19 G/133ML; G/133ML
1 ENEMA RECTAL ONCE AS NEEDED
Status: DISCONTINUED | OUTPATIENT
Start: 2021-09-20 | End: 2021-09-20

## 2021-09-20 RX ORDER — ACETAMINOPHEN 325 MG/1
650 TABLET ORAL EVERY 6 HOURS PRN
Status: DISCONTINUED | OUTPATIENT
Start: 2021-09-20 | End: 2021-09-20

## 2021-09-20 RX ORDER — SODIUM CHLORIDE, SODIUM LACTATE, POTASSIUM CHLORIDE, CALCIUM CHLORIDE 600; 310; 30; 20 MG/100ML; MG/100ML; MG/100ML; MG/100ML
INJECTION, SOLUTION INTRAVENOUS CONTINUOUS
Status: DISCONTINUED | OUTPATIENT
Start: 2021-09-20 | End: 2021-09-20

## 2021-09-20 RX ORDER — ONDANSETRON 2 MG/ML
4 INJECTION INTRAMUSCULAR; INTRAVENOUS ONCE
Status: COMPLETED | OUTPATIENT
Start: 2021-09-20 | End: 2021-09-20

## 2021-09-20 RX ORDER — DIPHENHYDRAMINE HYDROCHLORIDE 50 MG/ML
25 INJECTION INTRAMUSCULAR; INTRAVENOUS ONCE
Status: COMPLETED | OUTPATIENT
Start: 2021-09-20 | End: 2021-09-20

## 2021-09-20 RX ORDER — BISACODYL 10 MG
10 SUPPOSITORY, RECTAL RECTAL
Status: DISCONTINUED | OUTPATIENT
Start: 2021-09-20 | End: 2021-09-20

## 2021-09-20 RX ORDER — POLYETHYLENE GLYCOL 3350 17 G/17G
17 POWDER, FOR SOLUTION ORAL DAILY PRN
Status: DISCONTINUED | OUTPATIENT
Start: 2021-09-20 | End: 2021-09-20

## 2021-09-20 RX ORDER — LEVOFLOXACIN 5 MG/ML
750 INJECTION, SOLUTION INTRAVENOUS EVERY 24 HOURS
Status: DISCONTINUED | OUTPATIENT
Start: 2021-09-20 | End: 2021-09-20

## 2021-09-20 RX ORDER — MELATONIN
3 NIGHTLY PRN
Status: DISCONTINUED | OUTPATIENT
Start: 2021-09-20 | End: 2021-09-20

## 2021-09-20 RX ORDER — ENOXAPARIN SODIUM 100 MG/ML
40 INJECTION SUBCUTANEOUS DAILY
Status: DISCONTINUED | OUTPATIENT
Start: 2021-09-20 | End: 2021-09-20

## 2021-09-20 NOTE — PLAN OF CARE
Patients vitals WDL, no reports of nausea or vomiting. Denies pain. Active bowel sounds on all four quadrants. Advancing patient to clear liquid diet. Talked to patient about plan for the day, patient verbally confirmed understanding.

## 2021-09-20 NOTE — ED QUICK NOTES
Pt called out, states she doesn't want to stay for a CT scan and wants to go home. Pt informed of risks of leaving. MD aware.

## 2021-09-20 NOTE — PLAN OF CARE
Pt seen by Dr. Ebony Haro. Tolerating advancing diet. No further nausea, vomiting or diarrhea. OK for dc to home and continue her current home antibiotics. Written and verbal dc instructions given to pt and mom. Verbalized understanding.   Left hospital at

## 2021-09-20 NOTE — ED INITIAL ASSESSMENT (HPI)
Pt had surgery 4 days ago for appendicitis. She was told to return to the ER if she had any N/V/D and she has all three.

## 2021-09-20 NOTE — PROGRESS NOTES
[facility] Pharmacy Note:  Weight-Based Dose Adjustment    Wilson Coffey has been prescribed ketorolac (TORADOL) 30 mg IV every 6 hours prn pain. Patient is <49.9 kg therefore the dose has been adjusted to 15 mg IV every 6 hours prn pain.       Thank you,  Martha Henry

## 2021-09-20 NOTE — ED PROVIDER NOTES
Patient Seen in: [facility] Emergency Department      History   Patient presents with:  Nausea/Vomiting/Diarrhea    Stated Complaint: diarrhea and vomiting after appy    Subjective:   HPI    This is a pleasant 25-year-old female presenting with vomi Abnormal; Notable for the following components:       Result Value    Glucose 109 (*)     Total Protein 8.7 (*)     Globulin  5.0 (*)     A/G Ratio 0.7 (*)     All other components within normal limits   URINALYSIS WITH CULTURE REFLEX - Abnormal; Notable f ICD-10-CM Noted POA    * (Principal) [facility] K56.609 9/20/2021 Unknown

## 2021-09-20 NOTE — CONSULTS
[facility]  Report of Consultation    Sukhwinder Husamson Patient Status:  Observation    2000 MRN [id number]   [facility] 3SW-A Attending Kassie Barth, DO   Hosp Day # 0 PCP None Pcp     Reason for Consultation:  I am seeing the these medications which have NOT CHANGED    ciprofloxacin 750 MG Oral Tab  Take 1 tablet (750 mg total) by mouth every 12 (twelve) hours for 10 days. Separate administration from antacids and iron products by at least 2 hours.   Qty: 20 tablet Refills: 0 non-tender, with no rebound or guarding. No peritoneal signs. No ascites. Liver is within normal limits. Spleen is not palpable. Extremities:  No lower extremity edema noted. Without clubbing or cyanosis. Skin: Normal texture and turgor.   Angelique Copper  Postsurgical changes noted in the region   of the appendix.  Small amount of free pelvic fluid is noted which is also nonspecific.  Bowel wall thickening is difficult to assess without oral contrast.   ABDOMINAL WALL:  No mass or hernia.     URINARY BLADD and pelvis showed fluid-filled loops of small bowel with no transition point. Findings could be consistent with small bowel obstruction versus ileus. Plan:  1. The patient is stable and does not require any acute surgical interventions  2.  The patien

## 2021-09-20 NOTE — PROGRESS NOTES
[facility] Dosing Service  Antibiotic Dosing    Raúl Eaton is a 23year old for whom pharmacy was consulted to dose Levaquin for treatment of an intra-abdominal infection.  Other antibiotics (not dosed by pharmacy): Flagyl    Allergies: is allergic to p

## 2021-09-20 NOTE — DIETARY NOTE
[address]     Admitting diagnosis:  SBO (small bowel obstruction) ([facility] [H42.682]    Ht: 157.5 cm (5' 2\")  Wt: 44.9 kg (99 lb). Body mass index is 18.11 kg/m².   IBW: 50kg      Labs/Meds reviewed    Diet: Orders

## 2021-09-20 NOTE — PROGRESS NOTES
NURSING ADMISSION NOTE      Patient admitted via stretcher. Placed on ISO r/o cdiff. Oriented to room. Safety precautions initiated. Bed in low position. Call light in reach.

## 2021-09-20 NOTE — CONSULTS
INFECTIOUS DISEASE CONSULT NOTE    Cristian Augustin Patient Status:  Observation    2000 MRN [id number]   [facility] 3SW-A Attending Carolyn Avery, DO   Hosp Day # 0 PCP None P infusion, , Intravenous, Continuous  •  enoxaparin (LOVENOX) 40 MG/0.4ML injection 40 mg, 40 mg, Subcutaneous, Daily  •  Ketorolac Tromethamine (TORADOL) injection 15 mg, 15 mg, Intravenous, Q6H PRN  •  lactated ringers infusion, , Intravenous, Continuous RBC 4.98 4.03   HGB 13.4 10.9*   HCT 41.7 34.5*   MCV 83.7 85.6   MCH 26.9 27.0   MCHC 32.1 31.6   RDW 13.3 13.3   NEPRELIM 11.85*  --    WBC 14.0* 9.1   .0* 460.0*     Recent Labs   Lab 09/19/21  2333   *   BUN 12   CREATSERUM 0.64   GFRAA No mass or enlargement. AORTA/VASCULAR:  No aneurysm or dissection. RETROPERITONEUM:  No mass or adenopathy. BOWEL/MESENTERY:  Lack of oral contrast limits assessment of the bowel.   There are mildly prominent loops of small bowel noted which appear flui ABD/PEL W CONTRAST (GDC=33861)  COMPARISON:  None. INDICATIONS:  eval for appendicitis  TECHNIQUE:  Axial helical acquisitions are obtained from through the abdomen and pelvis after bolus intravenous nonionic contrast administration.   Images of the right value result was called to Dr. Jovana Mohr on 9/9/2021 at 9:15 p.m. Jaxon Tavares Results read back was performed. Dictated by (CST): Niki Collier MD on 9/09/2021 at 9:09 PM     Finalized by (CST): Niki Collier MD on 9/09/2021 at 9:16 PM            ASSESSMENT:  1.

## 2021-09-20 NOTE — H&P
[facility]  History and Physical     Juice Fontanez Patient Status:  Emergency    2000 MRN [id number]   Location [facility] Attending Najma Abraham MD   Hosp Day # 0 PCP None Pcp     Chief Complaint: n/v/d, abdo mouth every 6 (six) hours as needed for Pain. (Patient not taking: Reported on 9/19/2021), Disp: 10 tablet, Rfl: 0        Review of Systems:   A comprehensive 14 point review of systems was completed. Pertinent positives and negatives noted in the HPI. hours.    Creatinine Kinase  No results for input(s): CK in the last 168 hours. Inflammatory Markers  No results for input(s): CRP, PHONG, LDH, DDIMER in the last 168 hours. Imaging: Imaging data reviewed in Epic. ASSESSMENT / PLAN:     1.  N/v/d,

## 2021-09-21 NOTE — DISCHARGE SUMMARY
[facility]  DISCHARGE SUMMARY     Jomari Valenzuela Patient Status:  Observation    2000 MRN [id number]   [facility] 3SW-A Attending No att. providers found   Hosp Day # 0 PCP None Pcp     Date of Admission: 2021  Date of Disch 10 days. Stop taking on: September 25, 2021  Quantity: 20 tablet  Refills: 0     ciprofloxacin 750 MG Tabs  Commonly known as: CIPRO      Take 1 tablet (750 mg total) by mouth every 12 (twelve) hours for 10 days.  Separate administration from antacids and

## 2021-09-22 ENCOUNTER — PATIENT OUTREACH (OUTPATIENT)
Dept: CASE MANAGEMENT | Age: 21
End: 2021-09-22

## 2021-09-22 NOTE — PROGRESS NOTES
Patient called requesting assistance scheduling apt    [facility]  [address] [phone]   Apt made with Judene Kayser PA for Fri. Sept. 24 @12:15pm    Patient notified

## 2021-09-23 NOTE — PAYOR COMM NOTE
--------------  ADMISSION REVIEW     Payor: [address] [facility]  Subscriber #:  [id number]  Authorization Number: [id number]    Admit date: 9/10/21  Admit time:  1:27 AM       9/9 Patient Seen in: [facility] Emergency Department    History   William Marshall No involuntary guarding but moderately/markedly tender. No CVA tenderness  Extremities: Unremarkable. Calves nonswollen, symmetric, nontender. No pedal edema. Skin: Unremarkable. No skin change or skin rash.         Labs Reviewed   COMP METABOLIC PANEL localized peritonitis, unspecified whether abscess present, unspecified whether gangrene present        History & Physical    Desoto Aas Patient Status:  Emergency    2000 MRN [id number]   [facility] SURGERY Attending Dom Brown bilaterally. Cardiac: Regular rate and rhythm. Abdomen: Soft, diffusely tender in the periumbilical, right lower quadrant and suprapubic region. There is positive Rovsing sign. McBurney point tenderness with focal rebound and guarding.   Liver is within Laparoscopic appendectomy and partial cecectomy. 2.       Drainage of abdominal abscess.     ASSISTANT:  OR staff.     ANESTHESIA:  General endotracheal anesthesia.       ANESTHESIOLOGIST:  Shauna Grande.  MD Milly.     BLOOD LOSS:  Approximately 10 mL partial cecectomy and appendectomy was well approximated without leakage and without compromise of the ileocecal valve.     OPERATIVE TECHNIQUE:  The patient was brought to the operating room and after induction of endotracheal anesthesia, the abdomen was partial cecectomy. Several firings of the blue cartridge Salcha stapler were used to perform the partial cecectomy which encompassed the appendiceal orifice and the defect therein.   The portion of cecum was placed into a separate retrieval bag and remove findings were discussed with the patient's family immediately upon conclusion of surgery.     Dictated By Jenny Cobb M.D.  d:     09/10/2021 00:45:15  t:      09/10/2021 03:46:12      9/11:    Subjective:  No new complaints. She denies pain.  She repor the above listed diagnoses and treatment options.     Son German PA-C  9/11/2021  2:08 PM  Patient seen and examined. She is status post cecectomy for perforated appendicitis. No flatus no BM yet.   Her abdomen has normal postoperative tenderness  Demetra Exam:     General: No acute distress. Alert and oriented x 3. Vital signs: Blood pressure 103/70, pulse 87, temperature 98.8 °F (37.1 °C), temperature source Oral, resp.  rate 18, height 5' 3\" (1.6 m), weight 112 lb 7 oz (51 kg), last menstrual period 09/ 11:37 PM     Specimen: Abdominal fluid;  Body fluid, unspecified   Result Value Ref Range     Body Fluid Culture Result 1+ growth Klebsiella oxytoca (A) N/A     Body Fluid Culture Result 1+ growth Escherichia coli (A) N/A     Body Fluid Culture Result 1+ gr CONTRAST (CPT=74177)  COMPARISON:  None. INDICATIONS:  eval for appendicitis  TECHNIQUE:  Axial helical acquisitions are obtained from through the abdomen and pelvis after bolus intravenous nonionic contrast administration.   Images of the right lower quad result was called to Dr. Ailyn Worley on 9/9/2021 at 9:15 p.m. Chavez Shields Results read back was performed. Dictated by (CST): Rosa Benjamin MD on 9/09/2021 at 9:09 PM     Finalized by (CST): Rosa Benjamin MD on 9/09/2021 at 9:16 PM              ASSESSMENT:  1.  Per Nondistended, without tympany to percussion. Soft, positive incisional tenderness. No rebound or guarding. No peritoneal signs. Incision:  Clean, dry, intact without erythema. Dermabond glue in place.   Drain: Drain with 25 cc of serosanguineous output examination, assessment and plan. I have I have edited the above to reflect my evaluation, opinion, and physical exam findings.     Exam and plan as above.     The patient is tolerating small amount of diet. Bethel Cater to go home. Events noted.   Exam shows a whether abscess present, unspecified whether gangrene present     POD 5 laparoscopic appendectomy, partial cecectomy, and drainage of intra-abdominal abscess  Postoperative ileus     Plan:  1. Advised patient to attempt sips of clear liquids today.   If she

## 2021-09-24 ENCOUNTER — OFFICE VISIT (OUTPATIENT)
Dept: SURGERY | Facility: CLINIC | Age: 21
End: 2021-09-24

## 2021-09-24 VITALS — WEIGHT: 100 LBS | BODY MASS INDEX: 17.72 KG/M2 | HEIGHT: 63 IN | TEMPERATURE: 98 F

## 2021-09-24 DIAGNOSIS — K56.7 ILEUS (HCC): ICD-10-CM

## 2021-09-24 DIAGNOSIS — K35.33 ACUTE APPENDICITIS WITH PERFORATION, LOCALIZED PERITONITIS, AND ABSCESS, WITHOUT GANGRENE: Primary | ICD-10-CM

## 2021-09-24 PROBLEM — K35.32 ACUTE APPENDICITIS WITH PERFORATION AND LOCALIZED PERITONITIS, UNSPECIFIED WHETHER ABSCESS PRESENT, UNSPECIFIED WHETHER GANGRENE PRESENT: Status: RESOLVED | Noted: 2021-09-09 | Resolved: 2021-09-24

## 2021-09-24 PROCEDURE — 3008F BODY MASS INDEX DOCD: CPT | Performed by: PHYSICIAN ASSISTANT

## 2021-09-24 PROCEDURE — 99024 POSTOP FOLLOW-UP VISIT: CPT | Performed by: PHYSICIAN ASSISTANT

## 2021-09-24 NOTE — PROGRESS NOTES
Post Operative Visit Note       Active Problems  1. Acute appendicitis with perforation, localized peritonitis, and abscess, without gangrene    2.  Ileus [facility])         Chief Complaint   Patient presents with:  Post-Op: LAPAROSCOPIC APPENDECTOMY, PARTIAL CE Rfl: 0  •  amoxicillin clavulanate 875-125 MG Oral Tab, Take 1 tablet (875 mg total) by mouth every 12 (twelve) hours for 10 days. , Disp: 20 tablet, Rfl: 0      Review of Systems  The Review of Systems has been reviewed by me during today.   Review of Syste nondistended, good bowel sounds. Incisions: All laparoscopic incisions are clean, dry, intact, without erythema, or cellulitis. Musculoskeletal:      Cervical back: Normal range of motion and neck supple. Skin:     General: Skin is warm and dry.

## 2022-05-18 ENCOUNTER — OFFICE VISIT (OUTPATIENT)
Dept: FAMILY MEDICINE CLINIC | Facility: CLINIC | Age: 22
End: 2022-05-18
Payer: COMMERCIAL

## 2022-05-18 ENCOUNTER — TELEPHONE (OUTPATIENT)
Dept: FAMILY MEDICINE CLINIC | Facility: CLINIC | Age: 22
End: 2022-05-18

## 2022-05-18 DIAGNOSIS — Z11.1 SCREENING FOR TUBERCULOSIS: Primary | ICD-10-CM

## 2022-05-18 PROCEDURE — 86580 TB INTRADERMAL TEST: CPT | Performed by: NURSE PRACTITIONER

## 2022-05-18 NOTE — TELEPHONE ENCOUNTER
Received medical records faxed from [facility] in [place]. Immunization history does not include MMR. Only documented immunizations found in medical history was Meningococcal x1 dose and HPV x2. Immunizations were added to [facility] EMR. Voicemail left for patient notifying her that MMR was not documented on records received today.  CB# left for any further questions

## 2022-05-18 NOTE — PATIENT INSTRUCTIONS
Please return in 48 to 72 hours to have your TB test read.  You can return to the clinic on 5/20/22 after 1:25pm or 5/21/22 before 1:25pm.

## 2022-05-20 ENCOUNTER — OFFICE VISIT (OUTPATIENT)
Dept: FAMILY MEDICINE CLINIC | Facility: CLINIC | Age: 22
End: 2022-05-20

## 2022-05-20 ENCOUNTER — OFFICE VISIT (OUTPATIENT)
Dept: FAMILY MEDICINE CLINIC | Facility: CLINIC | Age: 22
End: 2022-05-20
Payer: COMMERCIAL

## 2022-05-20 VITALS
OXYGEN SATURATION: 98 % | DIASTOLIC BLOOD PRESSURE: 67 MMHG | HEART RATE: 69 BPM | BODY MASS INDEX: 20.06 KG/M2 | SYSTOLIC BLOOD PRESSURE: 111 MMHG | TEMPERATURE: 99 F | WEIGHT: 109 LBS | RESPIRATION RATE: 18 BRPM | HEIGHT: 62 IN

## 2022-05-20 DIAGNOSIS — Z02.89 ENCOUNTER FOR PHYSICAL EXAMINATION RELATED TO EMPLOYMENT: Primary | ICD-10-CM

## 2022-05-20 DIAGNOSIS — Z11.1 ENCOUNTER FOR PPD SKIN TEST READING: Primary | ICD-10-CM

## 2022-05-20 DIAGNOSIS — Z23 NEED FOR TETANUS BOOSTER: ICD-10-CM

## 2022-05-20 LAB — INDURATION (): 0 MM (ref 0–11)

## 2022-05-20 PROCEDURE — 99213 OFFICE O/P EST LOW 20 MIN: CPT | Performed by: PHYSICIAN ASSISTANT

## 2022-05-20 PROCEDURE — 90471 IMMUNIZATION ADMIN: CPT | Performed by: PHYSICIAN ASSISTANT

## 2022-05-20 PROCEDURE — 3074F SYST BP LT 130 MM HG: CPT | Performed by: PHYSICIAN ASSISTANT

## 2022-05-20 PROCEDURE — 90715 TDAP VACCINE 7 YRS/> IM: CPT | Performed by: PHYSICIAN ASSISTANT

## 2022-05-20 PROCEDURE — 3008F BODY MASS INDEX DOCD: CPT | Performed by: PHYSICIAN ASSISTANT

## 2022-05-20 PROCEDURE — 3078F DIAST BP <80 MM HG: CPT | Performed by: PHYSICIAN ASSISTANT

## (undated) DEVICE — DRAIN CHANNEL 19FR BLAKE

## (undated) DEVICE — VISUALIZATION SYSTEM: Brand: CLEARIFY

## (undated) DEVICE — TISSUE RETRIEVAL SYSTEM: Brand: INZII RETRIEVAL SYSTEM

## (undated) DEVICE — MONOFILAMENT ABSORBABLE SUTURE: Brand: MAXON

## (undated) DEVICE — #11 STERILE BLADE: Brand: POLYMER COATED BLADES

## (undated) DEVICE — EXOFIN TISSUE ADHESIVE 1.0ML

## (undated) DEVICE — DRAIN RELIAVAC 100CC

## (undated) DEVICE — SUTURE MONOCRYL 4-0 PS-2

## (undated) DEVICE — TROCAR: Brand: KII FIOS FIRST ENTRY

## (undated) DEVICE — Device

## (undated) DEVICE — SOL  .9 1000ML BTL

## (undated) DEVICE — STERILE POLYISOPRENE POWDER-FREE SURGICAL GLOVES: Brand: PROTEXIS

## (undated) DEVICE — LIGHT HANDLE

## (undated) DEVICE — THE ECHELON, ECHELON ENDOPATH™ AND ECHELON FLEX™ FAMILIES OF ENDOSCOPIC LINEAR CUTTERS AND RELOADS ARE STERILE, SINGLE PATIENT USE INSTRUMENTS THAT SIMULTANEOUSLY CUT AND STAPLE TISSUE. THERE ARE SIX STAGGERED ROWS OF STAPLES, THREE ON EITHER SIDE OF THE CUT LINE. THE 45 MM INSTRUMENTS HAVE A STAPLE LINE THATIS APPROXIMATELY 45 MM LONG AND A CUT LINE THAT IS APPROXIMATELY 42 MM LONG. THE SHAFT CAN ROTATE FREELY IN BOTH DIRECTIONS AND AN ARTICULATION MECHANISM ON ARTICULATING INSTRUMENTS ENABLES BENDING THE DISTAL PORTIONOF THE SHAFT TO FACILITATE LATERAL ACCESS OF THE OPERATIVE SITE.THE INSTRUMENTS ARE SHIPPED WITHOUT A RELOAD AND MUST BE LOADED PRIOR TO USE. A STAPLE RETAINING CAP ON THE RELOAD PROTECTS THE STAPLE LEG POINTS DURING SHIPPING AND TRANSPORTATION. THE INSTRUMENTS’ LOCK-OUT FEATURE IS DESIGNED TO PREVENT A USED RELOAD FROM BEING REFIRED.: Brand: ECHELON ENDOPATH

## (undated) DEVICE — 40580 - THE PINK PAD - ADVANCED TRENDELENBURG POSITIONING KIT: Brand: 40580 - THE PINK PAD - ADVANCED TRENDELENBURG POSITIONING KIT

## (undated) DEVICE — LAP CHOLE/APPY CDS-LF: Brand: MEDLINE INDUSTRIES, INC.

## (undated) DEVICE — LIGASURE LAP MARYLAND 37CM

## (undated) DEVICE — CHLORAPREP 26ML APPLICATOR

## (undated) DEVICE — SCD SLEEVE KNEE HI BLEND

## (undated) DEVICE — TROCAR: Brand: KII® SLEEVE

## (undated) DEVICE — ENDOPATH ULTRA VERESS INSUFFLATION NEEDLES WITH LUER LOCK CONNECTORS: Brand: ENDOPATH

## (undated) DEVICE — TROCAR: Brand: KII SHIELDED BLADED ACCESS SYSTEM

## (undated) DEVICE — POUCH SPECIMEN WIRE 6X3 250ML

## (undated) NOTE — LETTER
May 20, 2022    [facility]  [place] [id number]      Dear Peyton Hammonds:    The following are the results of your recent tests. Please review the list of test results. Your result is the value on the left; we have also supplied the range of normal (low and high) values. Results for orders placed or performed in visit on 05/18/22   TB INTRADERMAL TEST   Result Value Ref Range    Date Given: 5/18/22     Site: right forearm     INDURATION (PPD) 0.0 0.0 - 11 mm       Your TB test is Negative with 0.0mm induration. Please call if you have further questions.             Tila Weir PA-C

## (undated) NOTE — LETTER
Date & Time: 9/20/2021, 3:35 AM  Patient: Monisha Bonilla  Encounter Provider(s):    Keyla Chaney MD         This certifies that I, Monisha Bonilla, a patient at an [facility] facility, am leaving the facility voluntarily and against the a

## (undated) NOTE — LETTER
[address]  [address], [address] [facility]  Authorization for Surgical Operation and Procedure     Date:___________                                                                                                         Time:__________ that can occur: fever and allergic reactions, hemolytic reactions, transmission of diseases such as Hepatitis, AIDS and Cytomegalovirus (CMV) and fluid overload.   In the event that I wish to have an autologous transfusion of my own blood, or a directed don when the applicable recovery period ends for purposes of reinstating the DNAR order.   10. Patients having a sterilization procedure: I understand that if the procedure is successful the results will be permanent and it will therefore be impossible for me t give me medicine and do additional procedures as necessary. Some examples are: Starting or using an “IV” to give me medicine, fluids or blood during my procedure, and having a breathing tube placed to help me breathe when I’m asleep (intubation).  In the ev rare but potential complications include headache, bleeding, infection, seizure, irregular heart rhythms, and nerve injury.     I can change my mind about having anesthesia services at any time before I get the medicine.    _________________________________